# Patient Record
Sex: FEMALE | Race: WHITE | NOT HISPANIC OR LATINO
[De-identification: names, ages, dates, MRNs, and addresses within clinical notes are randomized per-mention and may not be internally consistent; named-entity substitution may affect disease eponyms.]

---

## 2019-11-04 ENCOUNTER — APPOINTMENT (OUTPATIENT)
Dept: OPHTHALMOLOGY | Facility: CLINIC | Age: 74
End: 2019-11-04

## 2019-11-04 ENCOUNTER — OUTPATIENT (OUTPATIENT)
Dept: OUTPATIENT SERVICES | Facility: HOSPITAL | Age: 74
LOS: 1 days | End: 2019-11-04

## 2019-11-06 DIAGNOSIS — H40.009 PREGLAUCOMA, UNSPECIFIED, UNSPECIFIED EYE: ICD-10-CM

## 2025-02-18 ENCOUNTER — EMERGENCY (EMERGENCY)
Facility: HOSPITAL | Age: 80
LOS: 1 days | Discharge: HOME | End: 2025-02-18
Attending: EMERGENCY MEDICINE | Admitting: EMERGENCY MEDICINE
Payer: MEDICARE

## 2025-02-18 VITALS
RESPIRATION RATE: 17 BRPM | TEMPERATURE: 98 F | HEART RATE: 116 BPM | OXYGEN SATURATION: 98 % | WEIGHT: 100.09 LBS | SYSTOLIC BLOOD PRESSURE: 122 MMHG | DIASTOLIC BLOOD PRESSURE: 67 MMHG

## 2025-02-18 VITALS
HEART RATE: 101 BPM | DIASTOLIC BLOOD PRESSURE: 75 MMHG | OXYGEN SATURATION: 97 % | RESPIRATION RATE: 17 BRPM | TEMPERATURE: 98 F | SYSTOLIC BLOOD PRESSURE: 116 MMHG

## 2025-02-18 PROCEDURE — 70450 CT HEAD/BRAIN W/O DYE: CPT | Mod: 26

## 2025-02-18 PROCEDURE — 70450 CT HEAD/BRAIN W/O DYE: CPT | Mod: MC

## 2025-02-18 PROCEDURE — 99284 EMERGENCY DEPT VISIT MOD MDM: CPT | Mod: 25

## 2025-02-18 PROCEDURE — 99284 EMERGENCY DEPT VISIT MOD MDM: CPT

## 2025-02-18 RX ORDER — ACETAMINOPHEN 160 MG/5ML
650 SUSPENSION ORAL ONCE
Refills: 0 | Status: COMPLETED | OUTPATIENT
Start: 2025-02-18 | End: 2025-02-18

## 2025-02-18 RX ADMIN — ACETAMINOPHEN 650 MILLIGRAM(S): 160 SUSPENSION ORAL at 15:35

## 2025-02-18 NOTE — ED PROVIDER NOTE - CLINICAL SUMMARY MEDICAL DECISION MAKING FREE TEXT BOX
80 yo F with PMH of hypothyroidism (levothyroxine), atrial fibrillation (Eliquis), denies any other medical problems or chronic medications, p/w mechanical fall and head trauma. Patient states that she was walking her dog and she tripped and fell while trying to break up a dog fight and landed on her knees right hand and then hit her forehead against the pavement. This occurred approximately 4 hours prior to arrival.  Patient denies any LOC.  Was able to get up and ambulate post incident. She presents to the ED today because she is concerned that she is on Eliquis and had head trauma.  Complaining of some pain in the area where she hit her head/face and bilateral knee abrasions and pain. Last tetanus was 5 years ago. Denies any chest pain, shortness of breath, abdominal pain, neck pain, back pain or any focal neuro symptoms.  No other complaints. 80 yo F with PMH of hypothyroidism (levothyroxine), atrial fibrillation (Eliquis), remote history of cranial surgery for acoustic neuroma, denies any other medical problems or chronic medications, p/w mechanical fall and head trauma. Patient states that she was walking her dog and she tripped and fell while trying to break up a dog fight and landed on her knees right hand and then hit her forehead against the pavement. This occurred approximately 4 hours prior to arrival.  Patient denies any LOC.  Was able to get up and ambulate post incident. She presents to the ED today because she is concerned that she is on Eliquis and had head trauma.  Complaining of some pain in the area where she hit her head/face and bilateral knee abrasions and pain. Last tetanus was 5 years ago. Denies any dizziness, N/V, chest pain, shortness of breath, abdominal pain, neck pain, back pain or any focal neuro symptoms.  No other complaints. 78 yo F with PMH of hypothyroidism (levothyroxine), atrial fibrillation (Eliquis), remote history of cranial surgery for acoustic neuroma, denies any other medical problems or chronic medications, p/w mechanical fall and head trauma. Patient states that she was walking her dog and she tripped and fell while trying to break up a dog fight and landed on her knees right hand and then hit her forehead against the pavement. This occurred approximately 4 hours prior to arrival.  Patient denies any LOC.  Was able to get up and ambulate post incident. She presents to the ED today because she is concerned that she is on Eliquis and had head trauma.  Complaining of some pain in the area where she hit her head/face and bilateral knee abrasions and pain. Last tetanus was 5 years ago. Denies any dizziness, N/V, chest pain, shortness of breath, palpitations, abdominal pain, neck pain, back pain or any focal neuro symptoms.  No other complaints.    ED course: Vital signs noted.  Patient tachycardic with heart rate 116.  Rest of vital signs are within normal limits.  Suspect elevated heart rate secondary to anxiety.  Exam noted as above. CT head done and with no acute disease.  No x-rays indicated at this time.  Patient given Tylenol for pain. CT findings discussed with patient and patient reassured.  Advised OTC pain meds as needed for pain.  Non-toxic appearing and stable for discharge. To follow up outpatient. HR improved prior to discharge on repeat VS (101). Strict return precautions given.

## 2025-02-18 NOTE — ED PROVIDER NOTE - NSFOLLOWUPINSTRUCTIONS_ED_ALL_ED_FT
Please follow-up with your primary care doctor in 2 to 3 days.  Return to the emergency department if you develop any concerning symptoms.    Closed Head Injury    A closed head injury is an injury to your head that may or may not involve a traumatic brain injury (TBI). Symptoms of TBI can be short or long lasting and include headache, dizziness, interference with memory or speech, fatigue, confusion, changes in sleep, mood changes, nausea, depression/anxiety, and dulling of senses. Make sure to obtain proper rest which includes getting plenty of sleep, avoiding excessive visual stimulation, and avoiding activities that may cause physical or mental stress. Avoid any situation where there is potential for another head injury, including sports.    SEEK IMMEDIATE MEDICAL CARE IF YOU HAVE ANY OF THE FOLLOWING SYMPTOMS: unusual drowsiness, vomiting, severe dizziness, seizures, lightheadedness, muscular weakness, different pupil sizes, visual changes, or clear or bloody discharge from your ears or nose.

## 2025-02-18 NOTE — ED PROVIDER NOTE - ENMT, MLM
Airway patent, Nasal mucosa clear. Airway patent, Nasal mucosa clear.  Abrasions with mild swelling noted to mid forehead and bridge of nose, no nosebleed noted.

## 2025-02-18 NOTE — ED ADULT NURSE NOTE - NSFALLRISKINTERV_ED_ALL_ED

## 2025-02-18 NOTE — ED ADULT NURSE NOTE - OBJECTIVE STATEMENT
Alert and orientedx4, presenting to the ed ambulatory with steady gait, c/o fall abrasion/bruising noted to forehead and bridge of nose. Pt  s/p mechanical fall while walking her dog, Denies loc, but on eliquis. Patient denies chest pain, dizziness, or shortness of breath.

## 2025-02-18 NOTE — ED PROVIDER NOTE - MUSCULOSKELETAL, MLM
Spine appears normal, range of motion is not limited, no muscle or joint tenderness Spine appears normal, range of motion is not limited, no midline spine TTP. Mild b/l knee TTP with abrasions noted, no swelling with FROM of b/l knees, patient ambulating easily in ED, right  ring finger with abrasions and dried blood noted , FROM with no bony TTP.

## 2025-02-18 NOTE — ED PROVIDER NOTE - PATIENT PORTAL LINK FT
You can access the FollowMyHealth Patient Portal offered by [facility] by registering at the following website: [email or web address]. By joining [employer]’s FollowMyHealth portal, you will also be able to view your health information using other applications (apps) compatible with our system.

## 2025-02-18 NOTE — ED ADULT TRIAGE NOTE - CHIEF COMPLAINT QUOTE
pt. fell on street mechanical while walking her dog, fell to her knees and hit her forehead right on the bunny lines between her eyebrows. +hematoma formation. Denies LOC, is on eliquis. Denies dizziness or any focal  / neuro s/s

## 2025-02-18 NOTE — ED PROVIDER NOTE - SKIN, MLM
Skin normal color for race, warm, dry and intact. No evidence of rash. Abrasions with mild swelling noted to mid forehead and bridge of nose, Mild b/l knee TTP with abrasions noted, no swelling with FROM of b/l knees, patient ambulating easily in ED, right  ring finger with abrasions and dried blood noted , FROM with no bony TTP.

## 2025-02-18 NOTE — ED PROVIDER NOTE - OBJECTIVE STATEMENT
78 yo F with PMH of hypothyroidism (levothyroxine), atrial fibrillation (Eliquis), denies any other medical problems or chronic medications, p/w mechanical fall and head trauma. Patient states that she was walking her dog and she tripped and fell while trying to break up a dog fight and landed on her knees right hand and then hit her forehead against the pavement. This occurred approximately 4 hours prior to arrival.  Patient denies any LOC.  Was able to get up and ambulate post incident. She presents to the ED today because she is concerned that she is on Eliquis and had head trauma.  Complaining of some pain in the area where she hit her head/face and bilateral knee abrasions and pain. Last tetanus was 5 years ago. Denies any dizziness, N/V, chest pain, shortness of breath, abdominal pain, neck pain, back pain or any focal neuro symptoms.  No other complaints. 78 yo F with PMH of hypothyroidism (levothyroxine), atrial fibrillation (Eliquis), remote history of cranial surgery for acoustic neuroma, denies any other medical problems or chronic medications, p/w mechanical fall and head trauma. Patient states that she was walking her dog and she tripped and fell while trying to break up a dog fight and landed on her knees right hand and then hit her forehead against the pavement. This occurred approximately 4 hours prior to arrival.  Patient denies any LOC.  Was able to get up and ambulate post incident. She presents to the ED today because she is concerned that she is on Eliquis and had head trauma.  Complaining of some pain in the area where she hit her head/face and bilateral knee abrasions and pain. Last tetanus was 5 years ago. Denies any dizziness, N/V, chest pain, shortness of breath, abdominal pain, neck pain, back pain or any focal neuro symptoms.  No other complaints. 80 yo F with PMH of hypothyroidism (levothyroxine), atrial fibrillation (Eliquis), remote history of cranial surgery for acoustic neuroma, denies any other medical problems or chronic medications, p/w mechanical fall and head trauma. Patient states that she was walking her dog and she tripped and fell while trying to break up a dog fight and landed on her knees right hand and then hit her forehead against the pavement. This occurred approximately 4 hours prior to arrival.  Patient denies any LOC.  Was able to get up and ambulate post incident. She presents to the ED today because she is concerned that she is on Eliquis and had head trauma.  Complaining of some pain in the area where she hit her head/face and bilateral knee abrasions and pain. Last tetanus was 5 years ago. Denies any dizziness, N/V, chest pain, shortness of breath, palpitations, abdominal pain, neck pain, back pain or any focal neuro symptoms.  No other complaints.

## 2025-02-20 DIAGNOSIS — E03.9 HYPOTHYROIDISM, UNSPECIFIED: ICD-10-CM
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
## 2025-02-20 DIAGNOSIS — S80.211A ABRASION, RIGHT KNEE, INITIAL ENCOUNTER: ICD-10-CM

## 2025-02-20 DIAGNOSIS — S80.212A ABRASION, LEFT KNEE, INITIAL ENCOUNTER: ICD-10-CM

## 2025-02-20 DIAGNOSIS — S60.414A ABRASION OF RIGHT RING FINGER, INITIAL ENCOUNTER: ICD-10-CM

## 2025-02-20 DIAGNOSIS — S00.81XA ABRASION OF OTHER PART OF HEAD, INITIAL ENCOUNTER: ICD-10-CM

## 2025-02-20 DIAGNOSIS — S09.90XA UNSPECIFIED INJURY OF HEAD, INITIAL ENCOUNTER: ICD-10-CM

## 2025-02-20 DIAGNOSIS — Z79.01 LONG TERM (CURRENT) USE OF ANTICOAGULANTS: ICD-10-CM

## 2025-02-20 DIAGNOSIS — Z91.040 LATEX ALLERGY STATUS: ICD-10-CM
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
## 2025-02-20 DIAGNOSIS — I48.91 UNSPECIFIED ATRIAL FIBRILLATION: ICD-10-CM
